# Patient Record
Sex: FEMALE | URBAN - METROPOLITAN AREA
[De-identification: names, ages, dates, MRNs, and addresses within clinical notes are randomized per-mention and may not be internally consistent; named-entity substitution may affect disease eponyms.]

---

## 2017-03-21 ENCOUNTER — APPOINTMENT (RX ONLY)
Age: 47
Setting detail: DERMATOLOGY
End: 2017-03-21

## 2017-03-21 DIAGNOSIS — L98429 CHRONIC ULCER OF OTHER SPECIFIED SITES: ICD-10-CM

## 2017-03-21 DIAGNOSIS — Z80.8 FAMILY HISTORY OF MALIGNANT NEOPLASM OF OTHER ORGANS OR SYSTEMS: ICD-10-CM

## 2017-03-21 DIAGNOSIS — D485 NEOPLASM OF UNCERTAIN BEHAVIOR OF SKIN: ICD-10-CM

## 2017-03-21 DIAGNOSIS — L98419 CHRONIC ULCER OF OTHER SPECIFIED SITES: ICD-10-CM

## 2017-03-21 PROBLEM — D48.5 NEOPLASM OF UNCERTAIN BEHAVIOR OF SKIN: Status: ACTIVE | Noted: 2017-03-21

## 2017-03-21 PROBLEM — L98.499 NON-PRESSURE CHRONIC ULCER OF SKIN OF OTHER SITES WITH UNSPECIFIED SEVERITY: Status: ACTIVE | Noted: 2017-03-21

## 2017-03-21 PROCEDURE — ? DIAGNOSIS COMMENT

## 2017-03-21 PROCEDURE — ? COUNSELING

## 2017-03-21 PROCEDURE — 99201: CPT | Mod: 25

## 2017-03-21 PROCEDURE — 11100: CPT

## 2017-03-21 PROCEDURE — ? TREATMENT REGIMEN

## 2017-03-21 PROCEDURE — ? BIOPSY BY PUNCH METHOD

## 2017-03-21 ASSESSMENT — LOCATION DETAILED DESCRIPTION DERM: LOCATION DETAILED: LEFT ANTERIOR PROXIMAL THIGH

## 2017-03-21 ASSESSMENT — LOCATION ZONE DERM: LOCATION ZONE: LEG

## 2017-03-21 ASSESSMENT — LOCATION SIMPLE DESCRIPTION DERM: LOCATION SIMPLE: LEFT THIGH

## 2017-03-21 NOTE — PROCEDURE: BIOPSY BY PUNCH METHOD
X Size Of Lesion In Cm (Optional): 0
Billing Type: Third-Party Bill
Post-Care Instructions: I reviewed with the patient in detail post-care instructions. Patient is to keep the biopsy site dry overnight, and then apply bacitracin twice daily until healed. Patient may apply hydrogen peroxide soaks to remove any crusting.
Hemostasis: None
Body Location Override (Optional - Billing Will Still Be Based On Selected Body Map Location If Applicable): Left thigh
Bill For Surgical Tray: no
Biopsy Type: H and E
Anesthesia Volume In Cc: 3
Consent: Written consent was obtained and risks were reviewed including but not limited to scarring, infection, bleeding, scabbing, incomplete removal, nerve damage and allergy to anesthesia.
Notification Instructions: Patient will be notified of biopsy results. However, patient instructed to call the office if not contacted within 2 weeks.
Anesthesia Type: 1% lidocaine with 1:100,000 epinephrine
Home Suture Removal Text: Patient was provided a home suture removal kit and will remove their sutures at home.  If they have any questions or difficulties they will call the office.
Deep Sutures: 4-0 Polysorb
Punch Size In Mm: 8
Dressing: pressure dressing with telfa
Suture Removal: 10 days
Wound Care: Aquaphor
Detail Level: Simple
Epidermal Sutures: 4-0 Surgipro

## 2017-03-21 NOTE — PROCEDURE: DIAGNOSIS COMMENT
Comment: Numerous firm painless nodules on the thighs at sites of medication injections, present for ~17 years, with recent ulceration on one on the left anterior thigh. DDx calcinosis cutis vs eosinophilic fasciitis vs lupus panniculitis vs polyarteritis nodosa vs other
Comment: On left thigh, overlying one of the firm nodules. Present for 2 months and improving gradually with mupirocin under occlusion. Previously culture positive for MSSA.
Detail Level: Zone

## 2017-03-21 NOTE — HPI: EVALUATION OF SKIN LESION(S)
Hpi Title: Evaluation of Skin Lesions
How Severe Are Your Spot(S)?: moderate
Have Your Spot(S) Been Treated In The Past?: has been treated
When Was It Treated?: Mupirocin
Family Member: Brother

## 2017-03-31 ENCOUNTER — APPOINTMENT (RX ONLY)
Age: 47
Setting detail: DERMATOLOGY
End: 2017-03-31

## 2017-03-31 DIAGNOSIS — Z48.02 ENCOUNTER FOR REMOVAL OF SUTURES: ICD-10-CM

## 2017-03-31 DIAGNOSIS — L94.2 CALCINOSIS CUTIS: ICD-10-CM

## 2017-03-31 PROBLEM — L20.84 INTRINSIC (ALLERGIC) ECZEMA: Status: ACTIVE | Noted: 2017-03-31

## 2017-03-31 PROCEDURE — ? DEFER

## 2017-03-31 PROCEDURE — ? COUNSELING

## 2017-03-31 PROCEDURE — 99213 OFFICE O/P EST LOW 20 MIN: CPT

## 2017-03-31 PROCEDURE — ? SUTURE REMOVAL (NO GLOBAL PERIOD)

## 2017-03-31 PROCEDURE — ? DIAGNOSIS COMMENT

## 2017-03-31 ASSESSMENT — LOCATION SIMPLE DESCRIPTION DERM
LOCATION SIMPLE: RIGHT THIGH
LOCATION SIMPLE: LEFT THIGH

## 2017-03-31 ASSESSMENT — LOCATION DETAILED DESCRIPTION DERM
LOCATION DETAILED: RIGHT ANTERIOR PROXIMAL THIGH
LOCATION DETAILED: LEFT ANTERIOR PROXIMAL THIGH

## 2017-03-31 ASSESSMENT — LOCATION ZONE DERM: LOCATION ZONE: LEG

## 2017-03-31 NOTE — PROCEDURE: DEFER
Procedure To Be Performed At Next Visit: Excision
Detail Level: Simple
Instructions (Optional): Symptomatic x 2\\nThighs Bilateral

## 2017-03-31 NOTE — PROCEDURE: DIAGNOSIS COMMENT
Comment: Now, with new-onset tenderness and erythema with impending ulceration on a lesion on the right thigh. Discussed treatment options. Patient declined diltiazam. Discussed excision of symptomatic lesions, including the risk of dehiscence of wound and chronic ulceration. Patient eagerly wanted to move forward with excision of ulcerated lesion on left thigh and symptomatic lesion on right thigh.
Detail Level: Zone

## 2017-03-31 NOTE — PROCEDURE: SUTURE REMOVAL (NO GLOBAL PERIOD)
Body Location Override (Optional - Billing Will Still Be Based On Selected Body Map Location If Applicable): Left thigh
Detail Level: Simple

## 2017-04-11 ENCOUNTER — APPOINTMENT (RX ONLY)
Age: 47
Setting detail: DERMATOLOGY
End: 2017-04-11

## 2017-04-11 DIAGNOSIS — L94.2 CALCINOSIS CUTIS: ICD-10-CM

## 2017-04-11 PROBLEM — F32.9 MAJOR DEPRESSIVE DISORDER, SINGLE EPISODE, UNSPECIFIED: Status: ACTIVE | Noted: 2017-04-11

## 2017-04-11 PROCEDURE — 99212 OFFICE O/P EST SF 10 MIN: CPT

## 2017-04-11 PROCEDURE — ? DIAGNOSIS COMMENT

## 2017-04-11 PROCEDURE — ? RECOMMENDATIONS

## 2017-04-11 PROCEDURE — ? PRESCRIPTION

## 2017-04-11 RX ORDER — DILTIAZEM HYDROCHLORIDE 120 MG/1
TABLET, EXTENDED RELEASE ORAL
Qty: 30 | Refills: 2 | Status: ERX | COMMUNITY
Start: 2017-04-11

## 2017-04-11 RX ADMIN — DILTIAZEM HYDROCHLORIDE: 120 TABLET, EXTENDED RELEASE ORAL at 22:51

## 2017-04-11 NOTE — PROCEDURE: DIAGNOSIS COMMENT
Detail Level: Zone
Comment: Rapidly progressing with several tender, erythematous and ulcerated confluent subcutaneous lesions, beyond the scope of outpatient surgery.\\n\\n-refer to general surgery for excision of symptomatic and ulcerated lesions\\n-start diltiazam  mg QD\\n-F/u 3 mo